# Patient Record
Sex: MALE | Race: WHITE
[De-identification: names, ages, dates, MRNs, and addresses within clinical notes are randomized per-mention and may not be internally consistent; named-entity substitution may affect disease eponyms.]

---

## 2017-01-06 ENCOUNTER — CHARTING TRANS (OUTPATIENT)
Dept: OTHER | Age: 42
End: 2017-01-06

## 2017-01-06 ENCOUNTER — LAB SERVICES (OUTPATIENT)
Dept: OTHER | Age: 42
End: 2017-01-06

## 2017-01-06 LAB — RAPID STREP GROUP A: NORMAL

## 2017-01-06 ASSESSMENT — PAIN SCALES - GENERAL: PAINLEVEL_OUTOF10: 9

## 2018-11-05 VITALS
DIASTOLIC BLOOD PRESSURE: 78 MMHG | HEART RATE: 84 BPM | RESPIRATION RATE: 14 BRPM | HEIGHT: 76 IN | WEIGHT: 200 LBS | OXYGEN SATURATION: 98 % | TEMPERATURE: 98 F | BODY MASS INDEX: 24.36 KG/M2 | SYSTOLIC BLOOD PRESSURE: 126 MMHG

## 2019-03-13 ENCOUNTER — ANESTHESIA EVENT (OUTPATIENT)
Dept: ENDOSCOPY | Facility: HOSPITAL | Age: 44
End: 2019-03-13
Payer: COMMERCIAL

## 2019-03-13 ENCOUNTER — ANESTHESIA (OUTPATIENT)
Dept: ENDOSCOPY | Facility: HOSPITAL | Age: 44
End: 2019-03-13
Payer: COMMERCIAL

## 2019-03-13 ENCOUNTER — HOSPITAL ENCOUNTER (OUTPATIENT)
Facility: HOSPITAL | Age: 44
Setting detail: HOSPITAL OUTPATIENT SURGERY
Discharge: HOME | End: 2019-03-13
Attending: INTERNAL MEDICINE | Admitting: INTERNAL MEDICINE
Payer: COMMERCIAL

## 2019-03-13 VITALS
OXYGEN SATURATION: 98 % | DIASTOLIC BLOOD PRESSURE: 82 MMHG | SYSTOLIC BLOOD PRESSURE: 121 MMHG | HEIGHT: 76 IN | BODY MASS INDEX: 24.72 KG/M2 | TEMPERATURE: 98 F | HEART RATE: 79 BPM | RESPIRATION RATE: 20 BRPM | WEIGHT: 203 LBS

## 2019-03-13 PROCEDURE — 0DBN8ZX EXCISION OF SIGMOID COLON, VIA NATURAL OR ARTIFICIAL OPENING ENDOSCOPIC, DIAGNOSTIC: ICD-10-PCS | Performed by: INTERNAL MEDICINE

## 2019-03-13 PROCEDURE — 0DBP8ZX EXCISION OF RECTUM, VIA NATURAL OR ARTIFICIAL OPENING ENDOSCOPIC, DIAGNOSTIC: ICD-10-PCS | Performed by: INTERNAL MEDICINE

## 2019-03-13 PROCEDURE — 88305 TISSUE EXAM BY PATHOLOGIST: CPT | Performed by: INTERNAL MEDICINE

## 2019-03-13 RX ORDER — SODIUM CHLORIDE, SODIUM LACTATE, POTASSIUM CHLORIDE, CALCIUM CHLORIDE 600; 310; 30; 20 MG/100ML; MG/100ML; MG/100ML; MG/100ML
INJECTION, SOLUTION INTRAVENOUS CONTINUOUS
Status: DISCONTINUED | OUTPATIENT
Start: 2019-03-13 | End: 2019-03-13

## 2019-03-13 NOTE — ANESTHESIA POSTPROCEDURE EVALUATION
[address] [facility] Yvrose Holt Patient Status:  Hospital Outpatient Surgery   Age/Gender 45year old male MRN [id number]   Location [facility]. Attending Marcelina Garibay MD   Hosp Day # 0 PCP Sunday Fortune       Anesthesia Post-op Note    Proc

## 2019-03-13 NOTE — ANESTHESIA PREPROCEDURE EVALUATION
PRE-OP EVALUATION    Patient Name: Vinicio Holt    Pre-op Diagnosis: Diana Face    Procedure(s):  COLONOSCOPY    Surgeon(s) and Role:     Jessica Fregoso MD - Primary    Pre-op vitals reviewed.   Temp: 98.3 °F (36.8 °C)  Pulse: 84  Resp: 20  BP: 124/91  S

## 2019-03-13 NOTE — OPERATIVE REPORT
Ama Holt Patient Status:  Hospital Outpatient Surgery    1975 MRN [id number]   [facility] ENDOSCOPY Attending Stanford Jones MD   Hosp Day # 0 PCP Sharyle Sever     PREOPERATIVE DIAGNOSIS/INDICATION: hematochezia  POSTOPERTATIVE D bleeding, infection, perforation, adverse drug reactions   - Follow biopsies.  - Repeat colonoscopy depending on pathology results. - take high fiber diet and drink plenty of fluids.   CC Report:   Thony Zhagn  3/13/2019  4:19 PM

## 2019-04-01 NOTE — H&P
[address] Patient Status:  Hospital Outpatient Surgery    1975 MRN [id number]   [facility] ENDOSCOPY Attending No att. providers found   Hosp Day # 0 PCP ZAHIDA JARVIS     CC: hematochezia and rig today    Raphael Flannery  3/31/2019  11:14 PM

## (undated) DEVICE — TRAP 4 CPTR CHMBR N EZ INLN

## (undated) DEVICE — ENDOSCOPY PACK - LOWER: Brand: MEDLINE INDUSTRIES, INC.

## (undated) DEVICE — 1200CC GUARDIAN II: Brand: GUARDIAN

## (undated) DEVICE — FILTERLINE NASAL ADULT O2/CO2

## (undated) DEVICE — 3M™ RED DOT™ MONITORING ELECTRODE WITH FOAM TAPE AND STICKY GEL, 50/BAG, 20/CASE, 72/PLT 2570: Brand: RED DOT™

## (undated) DEVICE — SNARE CAPTIFLEX STD OVAL OLY

## (undated) DEVICE — Device: Brand: DEFENDO AIR/WATER/SUCTION AND BIOPSY VALVE

## (undated) NOTE — LETTER
Aide Small [phone] [address]  [place], [address] [facility]    Consent for Operation  Date: __________________                                Time: _______________    1.  I authorize the performance upon Storm Rather the following operation:  Procedure(s) videotape. [facility] will not be responsible for storage or maintenance of this tape. 7. For the purpose of advancing medical education, I consent to the admittance of observers to the Operating Room.   8. I authorize the use of any specimen, organs, ti When the patient is a minor or mentally incompetent to give consent:  Signature of person authorized to consent for patient: ___________________________   Relationship to patient: ____________________________________________________    Signature of Witness these medicines may increase my risk of anesthetic complications. iv. If I am allergic to anything or have had a reaction to anesthesia before. 3. I understand how the anesthesia medicine will help me (benefits).   4. I understand that with any type of an patient’s representative) and answered their questions. The patient or their representative has agreed to have anesthesia services.     _____________________________________________________________________________  Witness        Date   Time  I have nicola